# Patient Record
Sex: MALE | Race: WHITE | NOT HISPANIC OR LATINO | Employment: FULL TIME | URBAN - METROPOLITAN AREA
[De-identification: names, ages, dates, MRNs, and addresses within clinical notes are randomized per-mention and may not be internally consistent; named-entity substitution may affect disease eponyms.]

---

## 2024-04-30 ENCOUNTER — OFFICE VISIT (OUTPATIENT)
Dept: FAMILY MEDICINE | Facility: CLINIC | Age: 34
End: 2024-04-30
Payer: COMMERCIAL

## 2024-04-30 VITALS
DIASTOLIC BLOOD PRESSURE: 86 MMHG | HEIGHT: 72 IN | SYSTOLIC BLOOD PRESSURE: 106 MMHG | RESPIRATION RATE: 10 BRPM | OXYGEN SATURATION: 100 % | TEMPERATURE: 97 F | BODY MASS INDEX: 23.63 KG/M2 | WEIGHT: 174.5 LBS | HEART RATE: 59 BPM

## 2024-04-30 DIAGNOSIS — S61.218A: Primary | ICD-10-CM

## 2024-04-30 PROCEDURE — 99203 OFFICE O/P NEW LOW 30 MIN: CPT | Performed by: PHYSICIAN ASSISTANT

## 2024-04-30 ASSESSMENT — PAIN SCALES - GENERAL: PAINLEVEL: NO PAIN (0)

## 2024-04-30 NOTE — PROGRESS NOTES
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 (4/14/22)  No current outpatient medications on file.     Allergies   Allergen Reactions    No Known Allergies      PHYSICAL EXAM:    /86 (BP Location: Right arm, Patient Position: Sitting, Cuff Size: Adult Regular)   Pulse 59   Temp 97  F (36.1  C) (Tympanic)   Resp 10   Ht 1.829 m (6')   Wt 79.2 kg (174 lb 8 oz)   SpO2 100%   BMI 23.67 kg/m      Patient appears non toxic  L pinky finger has a distal clean cut in shape of a [place] that is superficial with blood but no active bleeding    Assessment and Plan:     (C86.069O) Laceration of finger, little, initial encounter  (primary encounter diagnosis)    Plan: there is no deep cut or flap requiring a suture.  Clean pressure dressing applied. Recd using Xeroform for the next 2-3 days. Daily dressing changes advised.  Pt discussed signs of infection to watch for.      Edith Hall PA-C

## 2024-04-30 NOTE — PATIENT INSTRUCTIONS
Xeroform dressing for the next few days  Okay to wash with soap and water  If this starts to look red, if there is pus draining then follow up